# Patient Record
Sex: FEMALE | Race: WHITE | NOT HISPANIC OR LATINO | URBAN - METROPOLITAN AREA
[De-identification: names, ages, dates, MRNs, and addresses within clinical notes are randomized per-mention and may not be internally consistent; named-entity substitution may affect disease eponyms.]

---

## 2024-01-22 ENCOUNTER — AMBULATORY SURGICAL CENTER (OUTPATIENT)
Dept: URBAN - METROPOLITAN AREA SURGERY 4 | Facility: SURGERY | Age: 74
End: 2024-01-22
Payer: MEDICARE

## 2024-01-22 ENCOUNTER — OFFICE (OUTPATIENT)
Dept: URBAN - METROPOLITAN AREA PATHOLOGY 1 | Facility: PATHOLOGY | Age: 74
End: 2024-01-22
Payer: MEDICARE

## 2024-01-22 VITALS
OXYGEN SATURATION: 97 % | RESPIRATION RATE: 12 BRPM | DIASTOLIC BLOOD PRESSURE: 73 MMHG | RESPIRATION RATE: 14 BRPM | SYSTOLIC BLOOD PRESSURE: 120 MMHG | DIASTOLIC BLOOD PRESSURE: 68 MMHG | SYSTOLIC BLOOD PRESSURE: 125 MMHG | SYSTOLIC BLOOD PRESSURE: 103 MMHG | DIASTOLIC BLOOD PRESSURE: 71 MMHG | RESPIRATION RATE: 12 BRPM | SYSTOLIC BLOOD PRESSURE: 111 MMHG | HEART RATE: 58 BPM | RESPIRATION RATE: 16 BRPM | RESPIRATION RATE: 19 BRPM | RESPIRATION RATE: 18 BRPM | HEART RATE: 54 BPM | HEART RATE: 52 BPM | HEIGHT: 66 IN | HEART RATE: 50 BPM | HEART RATE: 70 BPM | SYSTOLIC BLOOD PRESSURE: 136 MMHG | DIASTOLIC BLOOD PRESSURE: 63 MMHG | HEART RATE: 59 BPM | RESPIRATION RATE: 17 BRPM | HEART RATE: 68 BPM | HEART RATE: 68 BPM | HEART RATE: 55 BPM | RESPIRATION RATE: 17 BRPM | HEART RATE: 53 BPM | SYSTOLIC BLOOD PRESSURE: 111 MMHG | HEART RATE: 53 BPM | OXYGEN SATURATION: 99 % | DIASTOLIC BLOOD PRESSURE: 73 MMHG | SYSTOLIC BLOOD PRESSURE: 127 MMHG | DIASTOLIC BLOOD PRESSURE: 63 MMHG | OXYGEN SATURATION: 98 % | HEART RATE: 50 BPM | SYSTOLIC BLOOD PRESSURE: 144 MMHG | WEIGHT: 170 LBS | SYSTOLIC BLOOD PRESSURE: 103 MMHG | DIASTOLIC BLOOD PRESSURE: 75 MMHG | TEMPERATURE: 97.5 F | DIASTOLIC BLOOD PRESSURE: 69 MMHG | HEART RATE: 54 BPM | HEART RATE: 55 BPM | DIASTOLIC BLOOD PRESSURE: 76 MMHG | HEIGHT: 66 IN | SYSTOLIC BLOOD PRESSURE: 153 MMHG | DIASTOLIC BLOOD PRESSURE: 68 MMHG | HEART RATE: 58 BPM | SYSTOLIC BLOOD PRESSURE: 125 MMHG | HEART RATE: 70 BPM | DIASTOLIC BLOOD PRESSURE: 71 MMHG | WEIGHT: 170 LBS | SYSTOLIC BLOOD PRESSURE: 120 MMHG | TEMPERATURE: 97.5 F | OXYGEN SATURATION: 100 % | SYSTOLIC BLOOD PRESSURE: 127 MMHG | OXYGEN SATURATION: 99 % | RESPIRATION RATE: 20 BRPM | SYSTOLIC BLOOD PRESSURE: 144 MMHG | OXYGEN SATURATION: 98 % | SYSTOLIC BLOOD PRESSURE: 153 MMHG | HEART RATE: 52 BPM | OXYGEN SATURATION: 97 % | RESPIRATION RATE: 19 BRPM | DIASTOLIC BLOOD PRESSURE: 69 MMHG | OXYGEN SATURATION: 100 % | RESPIRATION RATE: 14 BRPM | RESPIRATION RATE: 16 BRPM | RESPIRATION RATE: 20 BRPM | HEART RATE: 59 BPM | DIASTOLIC BLOOD PRESSURE: 76 MMHG | SYSTOLIC BLOOD PRESSURE: 136 MMHG | RESPIRATION RATE: 18 BRPM | DIASTOLIC BLOOD PRESSURE: 75 MMHG

## 2024-01-22 DIAGNOSIS — K63.5 POLYP OF COLON: ICD-10-CM

## 2024-01-22 DIAGNOSIS — Z80.0 FAMILY HISTORY OF MALIGNANT NEOPLASM OF DIGESTIVE ORGANS: ICD-10-CM

## 2024-01-22 DIAGNOSIS — D12.2 BENIGN NEOPLASM OF ASCENDING COLON: ICD-10-CM

## 2024-01-22 DIAGNOSIS — Z09 ENCOUNTER FOR FOLLOW-UP EXAMINATION AFTER COMPLETED TREATMEN: ICD-10-CM

## 2024-01-22 DIAGNOSIS — D12.5 BENIGN NEOPLASM OF SIGMOID COLON: ICD-10-CM

## 2024-01-22 DIAGNOSIS — Z86.010 PERSONAL HISTORY OF COLONIC POLYPS: ICD-10-CM

## 2024-01-22 DIAGNOSIS — K57.30 DIVERTICULOSIS OF LARGE INTESTINE WITHOUT PERFORATION OR ABS: ICD-10-CM

## 2024-01-22 DIAGNOSIS — D12.3 BENIGN NEOPLASM OF TRANSVERSE COLON: ICD-10-CM

## 2024-01-22 PROCEDURE — 88305 TISSUE EXAM BY PATHOLOGIST: CPT | Performed by: PATHOLOGY

## 2024-01-22 PROCEDURE — 45385 COLONOSCOPY W/LESION REMOVAL: CPT | Mod: PT | Performed by: INTERNAL MEDICINE

## 2024-01-22 NOTE — SERVICEHPINOTES
This patient with a family history of colorectal cancer is seen for a screening colonoscopy. This patient has a personal history of adenomatous colon polyps and is now seen for colorectal surveillance/screening.span id="{[id number]}" class="narrative freetextSelected" type="freetext" canedit="true" suppressed="false" nid="[id number]" gid="{[id number]}" bound="false" visited="true"/spanspan id="{[id number]}" class="narrative placeholderNormal" type="placeholder" canedit="true" suppressed="true" text="[ROS Wording]" nid="[id number]" gid="{[id number]}" propertyname="rosWording" displayname="ROS Wording" tooltip="" handler="" handlerdata="" datatype="text" mandatory="false" requires="" allowmultipleentries="" describes="" tagname="" prototype="" dontshowempty="false" empty="true" onmouseover="__NarrativeOnMouseOver('{[id number]}')" onmouseout="__NarrativeOnMouseOut('{[id number]}')" onmousedown="__NarrativePlaceholderClicked('{[id number]}')"/spanspan id="{[id number]}" class="narrative freetextNormal" type="freetext" canedit="true" suppressed="false" nid="[id number]" gid="{[id number]}" bound="false" /spanSromeo had an adenomatous polyp in 2013. Her mother was diagnosed with colon cancer at age 45.

## 2024-01-29 LAB
PDF: PDF REPORT: (no result)
PDF: PDF REPORT: (no result)